# Patient Record
Sex: FEMALE | Race: WHITE | Employment: OTHER | URBAN - METROPOLITAN AREA
[De-identification: names, ages, dates, MRNs, and addresses within clinical notes are randomized per-mention and may not be internally consistent; named-entity substitution may affect disease eponyms.]

---

## 2017-06-26 ENCOUNTER — HOSPITAL ENCOUNTER (OUTPATIENT)
Dept: MAMMOGRAPHY | Age: 77
Discharge: HOME | End: 2017-06-26
Attending: OBSTETRICS & GYNECOLOGY
Payer: MEDICARE

## 2017-06-26 DIAGNOSIS — Z12.31 VISIT FOR SCREENING MAMMOGRAM: ICD-10-CM

## 2017-06-26 PROCEDURE — 77067 SCR MAMMO BI INCL CAD: CPT | Performed by: OBSTETRICS & GYNECOLOGY

## 2018-06-27 ENCOUNTER — HOSPITAL ENCOUNTER (OUTPATIENT)
Dept: BONE DENSITY | Age: 78
Discharge: HOME | End: 2018-06-27
Attending: OBSTETRICS & GYNECOLOGY
Payer: MEDICARE

## 2018-06-27 ENCOUNTER — HOSPITAL ENCOUNTER (OUTPATIENT)
Dept: MAMMOGRAPHY | Age: 78
Discharge: HOME | End: 2018-06-27
Attending: OBSTETRICS & GYNECOLOGY
Payer: MEDICARE

## 2018-06-27 DIAGNOSIS — Z12.31 ENCOUNTER FOR SCREENING MAMMOGRAM FOR MALIGNANT NEOPLASM OF BREAST: ICD-10-CM

## 2018-06-27 DIAGNOSIS — Z13.820 SCREENING FOR OSTEOPOROSIS: ICD-10-CM

## 2018-06-27 DIAGNOSIS — Z87.39 HISTORY OF OSTEOPOROSIS: ICD-10-CM

## 2018-06-27 DIAGNOSIS — M81.0 SENILE OSTEOPOROSIS: ICD-10-CM

## 2018-06-27 DIAGNOSIS — Z78.0 MENOPAUSE: ICD-10-CM

## 2018-06-27 PROCEDURE — 77067 SCR MAMMO BI INCL CAD: CPT | Performed by: OBSTETRICS & GYNECOLOGY

## 2018-06-27 PROCEDURE — 77063 BREAST TOMOSYNTHESIS BI: CPT | Performed by: OBSTETRICS & GYNECOLOGY

## 2018-06-27 PROCEDURE — 77080 DXA BONE DENSITY AXIAL: CPT | Performed by: OBSTETRICS & GYNECOLOGY

## 2019-06-09 ENCOUNTER — OFFICE VISIT (OUTPATIENT)
Dept: FAMILY MEDICINE CLINIC | Facility: CLINIC | Age: 79
End: 2019-06-09
Payer: MEDICARE

## 2019-06-09 VITALS
DIASTOLIC BLOOD PRESSURE: 60 MMHG | OXYGEN SATURATION: 96 % | HEIGHT: 62 IN | SYSTOLIC BLOOD PRESSURE: 102 MMHG | RESPIRATION RATE: 16 BRPM | BODY MASS INDEX: 21.16 KG/M2 | WEIGHT: 115 LBS | HEART RATE: 100 BPM | TEMPERATURE: 98 F

## 2019-06-09 DIAGNOSIS — H69.81 EUSTACHIAN TUBE DYSFUNCTION, RIGHT: Primary | ICD-10-CM

## 2019-06-09 PROCEDURE — 99202 OFFICE O/P NEW SF 15 MIN: CPT | Performed by: NURSE PRACTITIONER

## 2019-06-09 RX ORDER — GABAPENTIN 600 MG/1
TABLET ORAL
Refills: 3 | COMMUNITY
Start: 2019-01-19

## 2019-06-09 RX ORDER — LEVOTHYROXINE SODIUM 0.05 MG/1
TABLET ORAL
Refills: 9 | COMMUNITY
Start: 2019-05-26

## 2019-06-09 NOTE — PROGRESS NOTES
Jazmin Sweet is a 70year old female who presents for  right ear fullness sensation. Problem has been occurring for  2  weeks. Symptoms have same since onset. Was diagnosed with an ear infection a month ago. Denies pain now, more pressure.  No sic Alcohol use: Yes      Alcohol/week: 1.0 - 1.5 oz      Types: 2 - 3 drink(s) per week    Drug use: Not on file        REVIEW OF SYSTEMS:   GENERAL: feels well otherwise, no fever, no chills.   SKIN: no rashes  EYES:denies blurred or double vision any other Earaches can happen without an infection. This occurs when air and fluid build up behind the eardrum causing a feeling of fullness and discomfort and reduced hearing. This is called otitis media with effusion (OME) or serous otitis media.  It means there is · You may use over-the-counter decongestants such as phenylephrine or pseudoephedrine. But they are not always helpful. Don't use nasal spray decongestants more than 3 days. Longer use can make congestion worse.  Prescription nasal sprays from your doctor d

## 2019-06-28 ENCOUNTER — HOSPITAL ENCOUNTER (OUTPATIENT)
Dept: MAMMOGRAPHY | Age: 79
Discharge: HOME | End: 2019-06-28
Attending: OBSTETRICS & GYNECOLOGY
Payer: MEDICARE

## 2019-06-28 DIAGNOSIS — Z12.31 ENCOUNTER FOR SCREENING MAMMOGRAM FOR BREAST CANCER: ICD-10-CM

## 2019-06-28 PROCEDURE — 77063 BREAST TOMOSYNTHESIS BI: CPT | Performed by: OBSTETRICS & GYNECOLOGY

## 2019-06-28 PROCEDURE — 77067 SCR MAMMO BI INCL CAD: CPT | Performed by: OBSTETRICS & GYNECOLOGY

## 2019-07-18 ENCOUNTER — HOSPITAL ENCOUNTER (OUTPATIENT)
Dept: MAMMOGRAPHY | Facility: HOSPITAL | Age: 79
Discharge: HOME | End: 2019-07-18
Attending: OBSTETRICS & GYNECOLOGY
Payer: MEDICARE

## 2019-07-18 DIAGNOSIS — R92.2 INCONCLUSIVE MAMMOGRAM: ICD-10-CM

## 2019-07-18 PROCEDURE — 77065 DX MAMMO INCL CAD UNI: CPT | Performed by: OBSTETRICS & GYNECOLOGY

## 2019-07-18 PROCEDURE — 77061 BREAST TOMOSYNTHESIS UNI: CPT | Performed by: OBSTETRICS & GYNECOLOGY

## 2019-07-18 NOTE — IMAGING NOTE
This [facility] RN spoke with Toney Boles re stereotactic right breast biopsy recommendation by Dr. Akash Charles . Procedure reviewed and all questions answered. Pt instructed she may need to get blood drawn before biopsy.  She takes ASA for stroke a

## 2019-07-19 ENCOUNTER — TELEPHONE (OUTPATIENT)
Dept: MAMMOGRAPHY | Facility: HOSPITAL | Age: 79
End: 2019-07-19

## 2019-07-19 NOTE — TELEPHONE ENCOUNTER
Patient called with further questions regarding stereotactic breast biopsy. She was unsure if she was going to do biopsy here or elsewhere. She will be doing biopsy here and is scheduled for 8-2-19. All questions were answered.  Patient is on asa daily and

## 2019-08-02 ENCOUNTER — HOSPITAL ENCOUNTER (OUTPATIENT)
Dept: MAMMOGRAPHY | Facility: HOSPITAL | Age: 79
Discharge: HOME | End: 2019-08-02
Attending: OBSTETRICS & GYNECOLOGY
Payer: MEDICARE

## 2019-08-02 DIAGNOSIS — R92.1 BREAST CALCIFICATIONS: ICD-10-CM

## 2019-08-02 PROCEDURE — 19081 BX BREAST 1ST LESION STRTCTC: CPT | Performed by: OBSTETRICS & GYNECOLOGY

## 2019-08-02 PROCEDURE — 88305 TISSUE EXAM BY PATHOLOGIST: CPT

## 2019-08-07 ENCOUNTER — TELEPHONE (OUTPATIENT)
Dept: MAMMOGRAPHY | Facility: HOSPITAL | Age: 79
End: 2019-08-07

## 2019-09-30 ENCOUNTER — IMMUNIZATION (OUTPATIENT)
Dept: FAMILY MEDICINE CLINIC | Facility: CLINIC | Age: 79
End: 2019-09-30
Payer: MEDICARE

## 2019-09-30 DIAGNOSIS — Z23 NEED FOR VACCINATION: ICD-10-CM

## 2019-09-30 PROCEDURE — G0008 ADMIN INFLUENZA VIRUS VAC: HCPCS | Performed by: NURSE PRACTITIONER

## 2019-09-30 PROCEDURE — 90662 IIV NO PRSV INCREASED AG IM: CPT | Performed by: NURSE PRACTITIONER

## 2020-01-17 ENCOUNTER — HOSPITAL ENCOUNTER (OUTPATIENT)
Dept: MAMMOGRAPHY | Facility: HOSPITAL | Age: 80
Discharge: HOME | End: 2020-01-17
Attending: OBSTETRICS & GYNECOLOGY
Payer: MEDICARE

## 2020-01-17 DIAGNOSIS — N64.89 OTHER SPECIFIED DISORDERS OF BREAST: ICD-10-CM

## 2020-01-17 DIAGNOSIS — N60.92 BENIGN DISEASE OF LEFT BREAST: ICD-10-CM

## 2020-01-17 PROCEDURE — 77065 DX MAMMO INCL CAD UNI: CPT | Performed by: OBSTETRICS & GYNECOLOGY

## 2020-01-17 PROCEDURE — 77061 BREAST TOMOSYNTHESIS UNI: CPT | Performed by: OBSTETRICS & GYNECOLOGY

## 2020-07-08 ENCOUNTER — HOSPITAL ENCOUNTER (OUTPATIENT)
Dept: MAMMOGRAPHY | Age: 80
Discharge: HOME | End: 2020-07-08
Attending: OBSTETRICS & GYNECOLOGY
Payer: MEDICARE

## 2020-07-08 DIAGNOSIS — Z12.31 ENCOUNTER FOR SCREENING MAMMOGRAM FOR MALIGNANT NEOPLASM OF BREAST: ICD-10-CM

## 2020-07-08 PROCEDURE — 77063 BREAST TOMOSYNTHESIS BI: CPT | Performed by: OBSTETRICS & GYNECOLOGY

## 2020-07-08 PROCEDURE — 77067 SCR MAMMO BI INCL CAD: CPT | Performed by: OBSTETRICS & GYNECOLOGY

## 2021-03-17 NOTE — TELEPHONE ENCOUNTER
Pathology report reviewed by Dr Lexy Landrum and concordant with imaging. Pt notified that breast biopsy results were negative for malignancy, f/u in 6 months for repeat mammogram pt verbalized understanding, emotional support provided.  Pt's denies a yes

## 2025-01-09 ENCOUNTER — OFFICE VISIT (OUTPATIENT)
Dept: FAMILY MEDICINE CLINIC | Facility: CLINIC | Age: 85
End: 2025-01-09
Payer: MEDICARE

## 2025-01-09 VITALS
SYSTOLIC BLOOD PRESSURE: 156 MMHG | DIASTOLIC BLOOD PRESSURE: 88 MMHG | TEMPERATURE: 98 F | HEART RATE: 72 BPM | RESPIRATION RATE: 18 BRPM | OXYGEN SATURATION: 97 %

## 2025-01-09 DIAGNOSIS — Z01.30 BP CHECK: Primary | ICD-10-CM
